# Patient Record
Sex: FEMALE | Race: WHITE | URBAN - METROPOLITAN AREA
[De-identification: names, ages, dates, MRNs, and addresses within clinical notes are randomized per-mention and may not be internally consistent; named-entity substitution may affect disease eponyms.]

---

## 2022-01-20 ENCOUNTER — OFFICE (OUTPATIENT)
Dept: URBAN - METROPOLITAN AREA CLINIC 102 | Facility: CLINIC | Age: 32
End: 2022-01-20

## 2022-01-20 VITALS
TEMPERATURE: 97.9 F | DIASTOLIC BLOOD PRESSURE: 101 MMHG | SYSTOLIC BLOOD PRESSURE: 191 MMHG | HEIGHT: 66 IN | WEIGHT: 292 LBS | HEART RATE: 99 BPM

## 2022-01-20 DIAGNOSIS — K57.32 DIVERTICULITIS OF LARGE INTESTINE WITHOUT PERFORATION OR ABS: ICD-10-CM

## 2022-01-20 PROCEDURE — 99204 OFFICE O/P NEW MOD 45 MIN: CPT

## 2022-01-20 NOTE — SERVICEHPINOTES
ZOYA THOMAS   is a   31   year old    female who is being seen in consultation at the request of   MAI GARVIN   for diverticulitis. Reports first episode of diverticulitis was 3-4 years ago, dx via CT, treated outpt. Most recently, she had an attack of LLQ pain July 2021, went to [facility], CT showed uncomplicated acute diverticulitis of distal descending colon. Treated outpt (she thinks w/ augmentin), and pain resolved. She has not had any episodes since. BMs regular, typically 1-2x/day. Constipation during diverticulitis attack. No rectal bleeding. No prior colonoscopy. No family hx of colon cancer.

## 2022-02-03 ENCOUNTER — ON CAMPUS - OUTPATIENT (OUTPATIENT)
Dept: URBAN - METROPOLITAN AREA HOSPITAL 65 | Facility: HOSPITAL | Age: 32
End: 2022-02-03

## 2022-02-03 DIAGNOSIS — D12.3 BENIGN NEOPLASM OF TRANSVERSE COLON: ICD-10-CM

## 2022-02-03 DIAGNOSIS — R10.84 GENERALIZED ABDOMINAL PAIN: ICD-10-CM

## 2022-02-03 DIAGNOSIS — K63.5 POLYP OF COLON: ICD-10-CM

## 2022-02-03 DIAGNOSIS — K57.32 DIVERTICULITIS OF LARGE INTESTINE WITHOUT PERFORATION OR ABS: ICD-10-CM

## 2022-02-03 PROCEDURE — 45380 COLONOSCOPY AND BIOPSY: CPT | Mod: 59 | Performed by: INTERNAL MEDICINE

## 2022-02-03 PROCEDURE — 45385 COLONOSCOPY W/LESION REMOVAL: CPT | Performed by: INTERNAL MEDICINE
